# Patient Record
Sex: MALE | NOT HISPANIC OR LATINO
[De-identification: names, ages, dates, MRNs, and addresses within clinical notes are randomized per-mention and may not be internally consistent; named-entity substitution may affect disease eponyms.]

---

## 2017-07-24 PROBLEM — Z00.00 ENCOUNTER FOR PREVENTIVE HEALTH EXAMINATION: Status: ACTIVE | Noted: 2017-07-24

## 2017-08-02 ENCOUNTER — APPOINTMENT (OUTPATIENT)
Dept: ORTHOPEDIC SURGERY | Facility: CLINIC | Age: 82
End: 2017-08-02
Payer: MEDICARE

## 2017-08-02 DIAGNOSIS — Z80.9 FAMILY HISTORY OF MALIGNANT NEOPLASM, UNSPECIFIED: ICD-10-CM

## 2017-08-02 DIAGNOSIS — Z78.9 OTHER SPECIFIED HEALTH STATUS: ICD-10-CM

## 2017-08-02 DIAGNOSIS — Z82.61 FAMILY HISTORY OF ARTHRITIS: ICD-10-CM

## 2017-08-02 DIAGNOSIS — Z86.79 PERSONAL HISTORY OF OTHER DISEASES OF THE CIRCULATORY SYSTEM: ICD-10-CM

## 2017-08-02 DIAGNOSIS — Z60.2 PROBLEMS RELATED TO LIVING ALONE: ICD-10-CM

## 2017-08-02 PROCEDURE — 73564 X-RAY EXAM KNEE 4 OR MORE: CPT | Mod: LT

## 2017-08-02 PROCEDURE — 99204 OFFICE O/P NEW MOD 45 MIN: CPT

## 2017-08-02 PROCEDURE — 73560 X-RAY EXAM OF KNEE 1 OR 2: CPT | Mod: RT

## 2017-08-02 RX ORDER — PSEUDOEPHEDRINE HCL 30 MG
TABLET ORAL
Refills: 0 | Status: ACTIVE | COMMUNITY

## 2017-08-02 RX ORDER — LOSARTAN POTASSIUM 25 MG/1
25 TABLET, FILM COATED ORAL
Refills: 0 | Status: ACTIVE | COMMUNITY

## 2017-08-02 RX ORDER — NICOTINE 11MG/24HR
50 MCG PATCH, TRANSDERMAL 24 HOURS TRANSDERMAL
Refills: 0 | Status: ACTIVE | COMMUNITY

## 2017-08-02 RX ORDER — CARVEDILOL 6.25 MG/1
6.25 TABLET, FILM COATED ORAL
Refills: 0 | Status: ACTIVE | COMMUNITY

## 2017-08-02 RX ORDER — TERAZOSIN 2 MG/1
2 CAPSULE ORAL
Refills: 0 | Status: ACTIVE | COMMUNITY

## 2017-08-02 RX ORDER — ATORVASTATIN CALCIUM 20 MG/1
20 TABLET, FILM COATED ORAL
Refills: 0 | Status: ACTIVE | COMMUNITY

## 2017-08-02 SDOH — SOCIAL STABILITY - SOCIAL INSECURITY: PROBLEMS RELATED TO LIVING ALONE: Z60.2

## 2017-08-23 RX ORDER — CELECOXIB 200 MG/1
200 CAPSULE ORAL
Qty: 2 | Refills: 0 | Status: ACTIVE | COMMUNITY
Start: 2017-08-23 | End: 1900-01-01

## 2017-09-28 ENCOUNTER — APPOINTMENT (OUTPATIENT)
Dept: ORTHOPEDIC SURGERY | Facility: HOSPITAL | Age: 82
End: 2017-09-28

## 2017-10-18 ENCOUNTER — APPOINTMENT (OUTPATIENT)
Dept: ORTHOPEDIC SURGERY | Facility: CLINIC | Age: 82
End: 2017-10-18

## 2019-01-16 ENCOUNTER — APPOINTMENT (OUTPATIENT)
Dept: ORTHOPEDIC SURGERY | Facility: CLINIC | Age: 84
End: 2019-01-16
Payer: MEDICARE

## 2019-01-16 DIAGNOSIS — M17.0 BILATERAL PRIMARY OSTEOARTHRITIS OF KNEE: ICD-10-CM

## 2019-01-16 DIAGNOSIS — M25.562 PAIN IN LEFT KNEE: ICD-10-CM

## 2019-01-16 DIAGNOSIS — M21.162 VARUS DEFORMITY, NOT ELSEWHERE CLASSIFIED, LEFT KNEE: ICD-10-CM

## 2019-01-16 PROCEDURE — 20610 DRAIN/INJ JOINT/BURSA W/O US: CPT | Mod: LT

## 2019-01-16 PROCEDURE — 73564 X-RAY EXAM KNEE 4 OR MORE: CPT | Mod: LT

## 2019-01-16 PROCEDURE — 99214 OFFICE O/P EST MOD 30 MIN: CPT | Mod: 25

## 2019-01-16 PROCEDURE — 73560 X-RAY EXAM OF KNEE 1 OR 2: CPT | Mod: RT

## 2019-01-16 RX ORDER — MELOXICAM 7.5 MG/1
7.5 TABLET ORAL DAILY
Qty: 60 | Refills: 0 | Status: DISCONTINUED | COMMUNITY
Start: 2019-01-16 | End: 2019-01-16

## 2019-01-16 NOTE — ADDENDUM
[FreeTextEntry1] : This note was written by Chani Rodriguez on 01/16/2019 acting as scribe for Dr. Fernando Loera M.D.\par \par I, Dr. Fernando Loera M.D., have read and attest that all the information, medical decision making and discharge instructions within are true and accurate.\par

## 2019-01-16 NOTE — PROCEDURE
[de-identified] : Discussed at length with the patient the planned steroid and lidocaine injection. The risks, benefits, convalescence and alternatives were reviewed. The possible side effects discussed included but were not limited to: pain, swelling, heat and redness. There symptoms are generally mild but if they are extensive then contact the office. Giving pain relievers by mouth such as NSAID’s or Tylenol can generally treat the reactions to  steroid and lidocaine. Rare cases of infection have been noted. Rash, hives and itching may occur post injection. If you have muscle pain or cramps, flushing and or swelling of the face, rapid heart beat, nausea, dizziness, fever, chills, headache, difficulty breathing, swelling in the arms or legs, or have a prickly feeling of your skin, contact a health care provider immediately.\par  \par Following this discussion, the LEFT knee was prepped with betadine and under sterile conditions 5 cc of 1% lidocaine and 5 cc Zilretta were injected with a 21 gauge needle. The needle was introduced into the joint, aspiration was performed to ensure intra-articular placement and the medication was injected. Upon withdrawal of the needle the site was cleaned with alcohol and a bandaid applied. The patient tolerated the injection well and there were no adverse effects. Post injection instructions included no strenuous activity for 24 hours, cryotherapy and if there are any adverse effects to contact the office.\par

## 2019-01-16 NOTE — PHYSICAL EXAM
[FreeTextEntry2] :  Left knee\par  Inspection: trace effusion\par  Wounds: none. \par  Alignment: 15 degree varus \par  Palpation: medial tenderness on palpation. \par  ROM active (in degrees): 0-110 \par  Ligamentous laxity: all ligaments appear stable, negative ant. drawer test, negative post. drawer test, stable to varus stress test, stable to valgus stress test. negative Lachman's test, negative pivot shift test\par  Meniscal Test: negative McMurrays, negative Justin. \par  Patellofemoral Alignment Test: Q angle-, normal. \par  Muscle Test: good quad strength. \par \par  Right knee\par Inspection: no effusion or erythema. \par  Wounds: none. \par  Alignment: 5 degree varus \par  Palpation: no specific tenderness on palpation. \par  ROM active (in degrees): 0-120 \par  Ligamentous laxity: all ligaments appear stable, negative ant. drawer test, negative post. drawer test, stable to varus stress test, stable to valgus stress test. negative Lachman's test, negative pivot shift test\par  Meniscal Test: negative McMurrays, negative Justin. \par  Patellofemoral Alignment Test: Q angle-, normal. \par  Muscle Test: good quad strength.  [de-identified] : Right knee xray merchant view taken at the office today shows the patella in a central position with joint space narrowing, sclerosis and marginal osteophytes  consistent with patellofemoral arthritis \par \par Left Knee xrays, standing AP/Lateral and Merchant films, and 45 degree PA standing view, diffuse tricompartmental degenerative arthritis, medial joint space narrowing, marginal osteophytes present, patellofemoral joint space narrowing with patellofemoral arthritis, sclerosis, varus deformity, medial bone loss, Kellgren and Joe grade 4

## 2019-01-16 NOTE — HISTORY OF PRESENT ILLNESS
[All Other ROS Normal] : All other review of systems are negative except as noted [Joint Pain] : joint pain [FreeTextEntry1] : left knee pain  [FreeTextEntry2] : 89 y/o male presents for follow up evaluation of left knee pain due to degenerative arthritis. He was scheduled for a left TKR with Dr. Loera, but was cancelled due to his injection fraction as per his cardiologist. Patient reports that his cardiologist would not clear him for surgery and he is currently not a candidate for surgery. He continues to have medial sided pain with swelling at this time, and would like this knee aspirated. He states injections, including viscosupplementation and cortisone, have provided relief in the past. Today, he would like to discuss his nonoperative treatment options with Dr. Loera.

## 2019-04-22 ENCOUNTER — APPOINTMENT (OUTPATIENT)
Dept: ORTHOPEDIC SURGERY | Facility: CLINIC | Age: 84
End: 2019-04-22

## 2019-10-02 PROBLEM — Z60.2 PERSON LIVING ALONE: Status: ACTIVE | Noted: 2017-08-02

## 2019-12-27 ENCOUNTER — APPOINTMENT (OUTPATIENT)
Dept: OTOLARYNGOLOGY | Facility: CLINIC | Age: 84
End: 2019-12-27

## 2020-02-07 ENCOUNTER — APPOINTMENT (OUTPATIENT)
Dept: OTOLARYNGOLOGY | Facility: CLINIC | Age: 85
End: 2020-02-07

## 2021-11-12 NOTE — DISCUSSION/SUMMARY
[de-identified] : Discussed at length the nature of the patients condition. Their left knee symptoms appear secondary to degenerative arthritis with varus deformity. We reviewed operative and nonoperative treatment. While I believe he would eventually benefit from a left TKR, he was not cleared for surgery by his cardiologist due to his cardiac disease as detailed above. Therefore, we will continue nonoperatively. Patient was given a left knee interarticular Zilretta injections as detailed above for symptomatic relief. We have provided a prescription for an  brace for his varus deformity and referred him to Daniel Anand at [facility]. I suggested he speak to his cardiologist regarding his anti-inflammatory medications due to his cardiac disease. This was explained in the presence of their daughter. 
PAST MEDICAL HISTORY:  CHF (congestive heart failure)     COVID-19     HLD (hyperlipidemia)     HTN (hypertension)     Pacemaker